# Patient Record
Sex: FEMALE | Race: WHITE | NOT HISPANIC OR LATINO | Employment: OTHER | URBAN - NONMETROPOLITAN AREA
[De-identification: names, ages, dates, MRNs, and addresses within clinical notes are randomized per-mention and may not be internally consistent; named-entity substitution may affect disease eponyms.]

---

## 2024-02-02 ENCOUNTER — OFFICE VISIT (OUTPATIENT)
Dept: URGENT CARE | Facility: CLINIC | Age: 69
End: 2024-02-02
Payer: MEDICARE

## 2024-02-02 VITALS
OXYGEN SATURATION: 96 % | DIASTOLIC BLOOD PRESSURE: 80 MMHG | HEIGHT: 63 IN | RESPIRATION RATE: 18 BRPM | SYSTOLIC BLOOD PRESSURE: 152 MMHG | HEART RATE: 94 BPM | BODY MASS INDEX: 31.01 KG/M2 | WEIGHT: 175 LBS | TEMPERATURE: 97 F

## 2024-02-02 DIAGNOSIS — R05.1 ACUTE COUGH: Primary | ICD-10-CM

## 2024-02-02 DIAGNOSIS — B34.9 VIRAL ILLNESS: ICD-10-CM

## 2024-02-02 LAB
SARS-COV-2 AG UPPER RESP QL IA: NEGATIVE
VALID CONTROL: NORMAL

## 2024-02-02 PROCEDURE — G0463 HOSPITAL OUTPT CLINIC VISIT: HCPCS

## 2024-02-02 PROCEDURE — 99203 OFFICE O/P NEW LOW 30 MIN: CPT

## 2024-02-02 PROCEDURE — 87811 SARS-COV-2 COVID19 W/OPTIC: CPT

## 2024-02-02 RX ORDER — BLOOD SUGAR DIAGNOSTIC
STRIP MISCELLANEOUS 2 TIMES DAILY
COMMUNITY
Start: 2023-12-30

## 2024-02-02 RX ORDER — PHENOL 1.4 %
600 AEROSOL, SPRAY (ML) MUCOUS MEMBRANE 2 TIMES DAILY WITH MEALS
COMMUNITY

## 2024-02-02 RX ORDER — GLIPIZIDE AND METFORMIN HCL 2.5; 5 MG/1; MG/1
TABLET, FILM COATED ORAL
COMMUNITY
Start: 2024-01-25

## 2024-02-02 RX ORDER — MELATONIN
1000 DAILY
COMMUNITY

## 2024-02-02 RX ORDER — LISINOPRIL 20 MG/1
20 TABLET ORAL DAILY
COMMUNITY

## 2024-02-02 RX ORDER — LEVOTHYROXINE SODIUM 175 UG/1
175 TABLET ORAL DAILY
COMMUNITY

## 2024-02-02 RX ORDER — SIMVASTATIN 40 MG
40 TABLET ORAL DAILY
COMMUNITY
Start: 2024-01-31

## 2024-02-02 NOTE — PATIENT INSTRUCTIONS
OTC decongestant as needed for symptoms  Plenty of fluids  Can use honey   Cool mist humidifier   Warm gargle with salt water for sore throat   Use Tylenol/ibuprofen as needed for fever or pain    Follow up with PCP in 3-5 days.  Proceed to  ER if symptoms worsen.

## 2024-02-02 NOTE — PROGRESS NOTES
[facility]        NAME: Donna Gaming is a 68 y.o. female  : 1955    MRN: [id number]  DATE: 2024  TIME: 6:34 PM    Assessment and Plan   Acute cough [R05.1]  1. Acute cough  Poct Covid 19 Rapid Antigen Test      2. Viral illness          Rapid covid: neg    Patient Instructions     OTC decongestant as needed for symptoms  Plenty of fluids  Can use honey   Cool mist humidifier   Warm gargle with salt water for sore throat   Use Tylenol/ibuprofen as needed for fever or pain    Follow up with PCP in 3-5 days.  Proceed to  ER if symptoms worsen.    Chief Complaint     Chief Complaint   Patient presents with    Cold Like Symptoms     Sinus congestion, post nasal drip, feels like she has mucus stuck in her throat x3 days  Congestion gets to the point where she feels like she can't breathe.          History of Present Illness       URI   This is a new problem. Episode onset: 3 days. There has been no fever. Associated symptoms include congestion and rhinorrhea. Pertinent negatives include no chest pain, coughing, ear pain, sore throat or wheezing. Treatments tried: Mucinex.       Review of Systems   Review of Systems   Constitutional:  Negative for chills, diaphoresis, fatigue and fever.   HENT:  Positive for congestion, postnasal drip, rhinorrhea and sinus pressure. Negative for ear pain, sore throat and trouble swallowing.    Respiratory:  Negative for cough, chest tightness, shortness of breath and wheezing.    Cardiovascular:  Negative for chest pain and palpitations.   Skin:  Negative for color change.   Neurological:  Negative for dizziness and light-headedness.   Psychiatric/Behavioral:  Negative for sleep disturbance.          Current Medications       Current Outpatient Medications:     calcium carbonate (OS-ROLO) 600 MG tablet, Take 600 mg by mouth 2 (two) times a day with meals, Disp: , Rfl:     cholecalciferol (VITAMIN D3) 1,000 units tablet, Take 1,000 Units by mouth daily, Disp:  ", Rfl:     glipiZIDE-metFORMIN (METAGLIP) 2.5-500 MG per tablet, TAKE 1 TABLET BY MOUTH TWICE DAILY BEFORE MEAL(S), Disp: , Rfl:     levothyroxine 175 mcg tablet, Take 175 mcg by mouth daily, Disp: , Rfl:     lisinopril (ZESTRIL) 20 mg tablet, Take 20 mg by mouth daily, Disp: , Rfl:     OneTouch Ultra test strip, 2 (two) times a day Test, Disp: , Rfl:     simvastatin (ZOCOR) 40 mg tablet, Take 40 mg by mouth daily, Disp: , Rfl:     Current Allergies     Allergies as of 02/02/2024 - never reviewed   Allergen Reaction Noted    Penicillins Other (See Comments) 02/02/2024            The following portions of the patient's history were reviewed and updated as appropriate: allergies, current medications, past family history, past medical history, past social history, past surgical history and problem list.     Past Medical History:   Diagnosis Date    Diabetes mellitus (HCC)     Disease of thyroid gland     Hyperlipidemia     Hypertension        Past Surgical History:   Procedure Laterality Date    CYST REMOVAL Bilateral     wrist    THYROIDECTOMY, PARTIAL Right        Family History   Problem Relation Age of Onset    Diabetes Mother     Heart disease Father          Medications have been verified.        Objective   /80   Pulse 94   Temp (!) 97 °F (36.1 °C)   Resp 18   Ht 5' 3\" (1.6 m)   Wt 79.4 kg (175 lb)   SpO2 96%   BMI 31.00 kg/m²        Physical Exam     Physical Exam  Constitutional:       General: She is not in acute distress.     Appearance: Normal appearance. She is not ill-appearing.   HENT:      Head: Normocephalic.      Right Ear: Tympanic membrane and external ear normal.      Left Ear: Tympanic membrane and external ear normal.      Nose: No congestion.      Mouth/Throat:      Mouth: Mucous membranes are moist.      Pharynx: Oropharynx is clear.   Cardiovascular:      Rate and Rhythm: Normal rate and regular rhythm.      Pulses: Normal pulses.      Heart sounds: Normal heart sounds. "   Pulmonary:      Effort: Pulmonary effort is normal. No respiratory distress.      Breath sounds: Normal breath sounds. No stridor. No wheezing, rhonchi or rales.   Lymphadenopathy:      Cervical: No cervical adenopathy.   Skin:     General: Skin is warm and dry.   Neurological:      General: No focal deficit present.      Mental Status: She is alert and oriented to person, place, and time. Mental status is at baseline.   Psychiatric:         Mood and Affect: Mood normal.         Behavior: Behavior normal.         Thought Content: Thought content normal.         Judgment: Judgment normal.

## 2025-04-24 ENCOUNTER — APPOINTMENT (OUTPATIENT)
Dept: RADIOLOGY | Facility: CLINIC | Age: 70
End: 2025-04-24
Payer: COMMERCIAL

## 2025-04-24 ENCOUNTER — OFFICE VISIT (OUTPATIENT)
Dept: URGENT CARE | Facility: CLINIC | Age: 70
End: 2025-04-24
Payer: COMMERCIAL

## 2025-04-24 VITALS
HEIGHT: 64 IN | TEMPERATURE: 96.8 F | WEIGHT: 194 LBS | DIASTOLIC BLOOD PRESSURE: 88 MMHG | RESPIRATION RATE: 20 BRPM | BODY MASS INDEX: 33.12 KG/M2 | HEART RATE: 75 BPM | OXYGEN SATURATION: 93 % | SYSTOLIC BLOOD PRESSURE: 130 MMHG

## 2025-04-24 DIAGNOSIS — M25.562 ACUTE PAIN OF LEFT KNEE: Primary | ICD-10-CM

## 2025-04-24 DIAGNOSIS — M25.562 ACUTE PAIN OF LEFT KNEE: ICD-10-CM

## 2025-04-24 PROCEDURE — 99213 OFFICE O/P EST LOW 20 MIN: CPT

## 2025-04-24 PROCEDURE — 73562 X-RAY EXAM OF KNEE 3: CPT

## 2025-04-24 PROCEDURE — S9083 URGENT CARE CENTER GLOBAL: HCPCS

## 2025-04-24 NOTE — PATIENT INSTRUCTIONS
Preliminary XR read negative, final read pending  OTC Tylenol/Ibuprofen for pain  Knee brace  Rest, Ice, Compression, and Elevation  Referral placed to PT and Orthopedic Surgery   Follow up with PCP in 3-5 days.  Proceed to  ER if symptoms worsen.    If tests have been performed at [facility], our office will contact you with results if changes need to be made to the care plan discussed with you at the visit.  You can review your full results on [facility] MyChart.

## 2025-04-24 NOTE — PROGRESS NOTES
"  [facility]        NAME: Donna Gaming is a 70 y.o. female  : 1955    MRN: [id number]  DATE: 2025  TIME: 2:21 PM    Assessment and Plan   Acute pain of left knee [M25.562]  1. Acute pain of left knee  XR knee 3 vw left non injury    Ambulatory Referral to Physical Therapy    Ambulatory Referral to Orthopedic Surgery        Preliminary XR read negative for acute osseous abnormality, final read pending. Hinged knee brace applied by Eusebia Walton. DME paperwork completed. Encouraged continued supportive measures.  RICE therapy. Referral placed to Orthopedic Surgery and PT. Follow up with PCP in 3-5 days or proceed to emergency department for worsening symptoms.  Patient verbalized understanding of instructions given.       Patient Instructions     Preliminary XR read negative, final read pending  OTC Tylenol/Ibuprofen for pain  Knee brace  Rest, Ice, Compression, and Elevation  Referral placed to PT and Orthopedic Surgery   Follow up with PCP in 3-5 days.  Proceed to  ER if symptoms worsen.    If tests have been performed at [facility], our office will contact you with results if changes need to be made to the care plan discussed with you at the visit.  [facility]    Chief Complaint     Chief Complaint   Patient presents with    Knee Pain     Pt c/o left knee pain x1 week ago. Pt had to walk with cane to get around at first and now has been using a walker. Pt knee has been giving out .          History of Present Illness       70-year-old female with a past medical history significant for type II DM and hypertension presents with complaints of left knee pain x 1 week.  Patient denies known injury or trauma.  Reports symptoms waxing and waning however with majority of pain when going from sitting to standing position and bending knee.  States it will occasionally \"give out.\"  He states some swelling as well but denies any bruising or redness.  She " has been using an Ace wrap to help with swelling and also using both a cane and walker for ambulation.  Denies numbness, tingling, or weakness.  Able to ambulate and bear weight but with some difficulty.    Knee Pain   Pertinent negatives include no numbness.       Review of Systems   Review of Systems   Constitutional:  Negative for chills and fever.   Respiratory:  Negative for cough, shortness of breath and wheezing.    Cardiovascular:  Negative for chest pain.   Gastrointestinal:  Negative for abdominal pain, diarrhea, nausea and vomiting.   Musculoskeletal:  Positive for arthralgias, gait problem and joint swelling.   Skin:  Negative for rash.   Neurological:  Negative for weakness and numbness.         Current Medications       Current Outpatient Medications:     calcium carbonate (OS-ROLO) 600 MG tablet, Take 600 mg by mouth 2 (two) times a day with meals, Disp: , Rfl:     cholecalciferol (VITAMIN D3) 1,000 units tablet, Take 1,000 Units by mouth daily, Disp: , Rfl:     glipiZIDE-metFORMIN (METAGLIP) 2.5-500 MG per tablet, TAKE 1 TABLET BY MOUTH TWICE DAILY BEFORE MEAL(S), Disp: , Rfl:     levothyroxine 175 mcg tablet, Take 175 mcg by mouth daily, Disp: , Rfl:     OneTouch Ultra test strip, 2 (two) times a day Test, Disp: , Rfl:     simvastatin (ZOCOR) 40 mg tablet, Take 40 mg by mouth daily, Disp: , Rfl:     lisinopril (ZESTRIL) 20 mg tablet, Take 20 mg by mouth daily (Patient not taking: Reported on 4/24/2025), Disp: , Rfl:     Current Allergies     Allergies as of 04/24/2025 - Reviewed 04/24/2025   Allergen Reaction Noted    Penicillins Other (See Comments) 02/02/2024    Bupropion Other (See Comments) 06/11/2020            The following portions of the patient's history were reviewed and updated as appropriate: allergies, current medications, past family history, past medical history, past social history, past surgical history and problem list.     Past Medical History:   Diagnosis Date    Diabetes mellitus  "(HCC)     Disease of thyroid gland     Hyperlipidemia     Hypertension        Past Surgical History:   Procedure Laterality Date    CYST REMOVAL Bilateral     wrist    THYROIDECTOMY, PARTIAL Right        Family History   Problem Relation Age of Onset    Diabetes Mother     Heart disease Father          Medications have been verified.        Objective   /88   Pulse 75   Temp (!) 96.8 °F (36 °C)   Resp 20   Ht 5' 4\" (1.626 m)   Wt 88 kg (194 lb)   SpO2 93%   BMI 33.30 kg/m²   No LMP recorded.       Physical Exam     Physical Exam  Vitals and nursing note reviewed.   Constitutional:       General: She is not in acute distress.     Appearance: She is not toxic-appearing.   HENT:      Head: Normocephalic.   Eyes:      Conjunctiva/sclera: Conjunctivae normal.   Pulmonary:      Effort: Pulmonary effort is normal.   Musculoskeletal:         General: Swelling and tenderness present.      Left upper leg: Normal.      Left knee: Swelling and bony tenderness present. Decreased range of motion. Tenderness present over the lateral joint line and LCL.      Left lower leg: Normal.      Comments: TTP diffusely over left anterior knee with majority of tenderness to lateral aspect. Mild swelling without erythema or ecchymosis. Mildly reduced ROM, pain with flexion. Instability testing negative.    Skin:     General: Skin is warm and dry.   Neurological:      Mental Status: She is alert and oriented to person, place, and time.      Sensory: Sensation is intact.      Motor: Motor function is intact.      Gait: Gait is intact.   Psychiatric:         Mood and Affect: Mood normal.         Behavior: Behavior normal.                   "

## 2025-04-25 ENCOUNTER — RESULTS FOLLOW-UP (OUTPATIENT)
Dept: URGENT CARE | Facility: CLINIC | Age: 70
End: 2025-04-25

## 2025-05-19 RX ORDER — ALBUTEROL SULFATE 90 UG/1
2 INHALANT RESPIRATORY (INHALATION) EVERY 6 HOURS PRN
COMMUNITY

## 2025-05-20 ENCOUNTER — OFFICE VISIT (OUTPATIENT)
Dept: OBGYN CLINIC | Facility: CLINIC | Age: 70
End: 2025-05-20
Payer: COMMERCIAL

## 2025-05-20 VITALS — BODY MASS INDEX: 33.3 KG/M2 | HEIGHT: 64 IN

## 2025-05-20 DIAGNOSIS — M17.12 PRIMARY OSTEOARTHRITIS OF LEFT KNEE: Primary | ICD-10-CM

## 2025-05-20 PROCEDURE — 20610 DRAIN/INJ JOINT/BURSA W/O US: CPT | Performed by: STUDENT IN AN ORGANIZED HEALTH CARE EDUCATION/TRAINING PROGRAM

## 2025-05-20 PROCEDURE — 99204 OFFICE O/P NEW MOD 45 MIN: CPT | Performed by: STUDENT IN AN ORGANIZED HEALTH CARE EDUCATION/TRAINING PROGRAM

## 2025-05-20 RX ORDER — LEVOTHYROXINE SODIUM 150 UG/1
1 TABLET ORAL DAILY
COMMUNITY
Start: 2025-05-07

## 2025-05-20 RX ORDER — TRIAMCINOLONE ACETONIDE 40 MG/ML
40 INJECTION, SUSPENSION INTRA-ARTICULAR; INTRAMUSCULAR
Status: COMPLETED | OUTPATIENT
Start: 2025-05-20 | End: 2025-05-20

## 2025-05-20 RX ORDER — BUPIVACAINE HYDROCHLORIDE 5 MG/ML
3 INJECTION, SOLUTION PERINEURAL
Status: COMPLETED | OUTPATIENT
Start: 2025-05-20 | End: 2025-05-20

## 2025-05-20 RX ADMIN — TRIAMCINOLONE ACETONIDE 40 MG: 40 INJECTION, SUSPENSION INTRA-ARTICULAR; INTRAMUSCULAR at 09:00

## 2025-05-20 RX ADMIN — BUPIVACAINE HYDROCHLORIDE 3 ML: 5 INJECTION, SOLUTION PERINEURAL at 09:00

## 2025-05-20 NOTE — PROGRESS NOTES
Assessment & Plan  Primary osteoarthritis of left knee  Donna Gaming is a 70 y.o. year old female with chronic left knee pain due to osteoarthritis.      We had a shared medical decision making discussion with the patient regarding their diagnosis of knee arthritis  We reviewed the imaging and physical exam findings together that confirm the diagnosis of  Left knee arthritis.   We discussed the following treatments, and included the risks and benefits of each.   Lifestyle modifications, which include diet, exercise, and health changes, as well as changes in activities.  The benefits are clear of overall health improvements and there is minimal risk.   Physical therapy, which has been shown in several studies to be beneficial in reducing pain and improving function, but does not repair the cartilage.    Use of over-the-counter, non-narcotic pain relievers and anti-inflammatory medications are usually the first choice of therapy for arthritis of the knee. I think the most effective medications are nonsteroidal anti-inflammatory drugs, or NSAIDs. NSAIDs, such as ibuprofen, celebrex, and naproxen, are available both over-the-counter and by prescription and help by decreasing inflammation.  Injections such as steroid and viscosupplementation--both of which can help in patients with OA, but have the limitation of an overall small effect size.  We also discussed 'biologics' including PRP and stem cell injections. There is limited data right now to support the use of these treatment strategies, but some evidence that PRP can improve pain and function better than steroid injections.   Finally, we discussed surgical options, including arthroscopy, debridement, and osteotomies, Donna Gaming is not a candidate for these given the extent of their disease. We discussed the role of meeting with a total knee arthroplasty surgeon if the patient fails to improve with non-operative treatment.    At this point, the patient wishes to  proceed with CSI. Risks and benefits discussed with patient including increase in blood sugar and infection.   Orders:    Large joint arthrocentesis: L knee        General  Chief Complaint   Patient presents with    Left Knee - Pain, New Patient Visit        Subjective    Donna Gaming is a 70 y.o. female who presents with LEFT knee pain:    Chief Complaint   Patient presents with    Left Knee - Pain, New Patient Visit          History of Present Illness   The patient complains of left knee pain. The pain started several  years ago without injury. At that time the patient describes gradual increase her her pain.    The pain is 7/10. The pain is located Anterior. The pain is described as aching. They have tried NSAIDS and biofreeze for their problem. She will use an ace wrap for comfort. Pain improves with rest. Pain worsens with weightbearing activities like walking. She uses a Rolator walker to ambulate.     The patient did not hear a pop. The patient does have swelling.  The patient does not feel unstable.    Ortho Sports Medicine Patient Answers  Failed to redirect to the Timeline version of the [employer].  Allergies[1]  Encounter Medications[2]  Past Medical History:   Diagnosis Date    Diabetes mellitus (HCC)     Disease of thyroid gland     Hyperlipidemia     Hypertension      Past Surgical History:   Procedure Laterality Date    CYST REMOVAL Bilateral     wrist    THYROIDECTOMY, PARTIAL Right      Family History   Problem Relation Age of Onset    Diabetes Mother     Heart disease Father      Social History[3]        Objective    There were no vitals filed for this visit.  Body mass index is 33.3 kg/m².  Physical Exam  Vitals and nursing note reviewed.   Constitutional:       General: She is not in acute distress.     Appearance: She is well-developed.   HENT:      Head: Normocephalic and atraumatic.     Eyes:      Conjunctiva/sclera: Conjunctivae normal.       Cardiovascular:      Rate and Rhythm: Normal  rate and regular rhythm.      Heart sounds: No murmur heard.  Pulmonary:      Effort: Pulmonary effort is normal. No respiratory distress.      Breath sounds: Normal breath sounds.   Abdominal:      Palpations: Abdomen is soft.      Tenderness: There is no abdominal tenderness.     Musculoskeletal:         General: No swelling.      Cervical back: Neck supple.     Skin:     General: Skin is warm and dry.      Capillary Refill: Capillary refill takes less than 2 seconds.     Neurological:      Mental Status: She is alert.     Psychiatric:         Mood and Affect: Mood normal.       Knee Exam     left   Inspection: Without ecchymosis, wounds or prior incisions   Gait: Antalgic   Quadriceps atrophy: Mild   Tenderness: Medial Joint Line, Lateral Joint Line, Medial Patellar Facet, and Lateral Patellar Facet   ROM: 5-110   Effusion: Trace   Meniscus Exam   left   Medial Meniscus: Joint Line Tenderness   Lateral Meniscus: Joint Line Tenderness   Ligament Exam   Right   ACL Lachman: negative    Anterior Drawer:  negative   PCL Posterior Drawer:negative       MCL Stable at 0 and 30 degrees of flexion   LCL Stable at 0 and 30 degrees of flexion   PLC Deferred     Patellofemoral exam   left   Patella grind test negative   Patella instability: negative  1 Quadrants of translation   Patellar Translation Apprehension negative     Distally the patient's neurovascular status is normal.    Review of Prior Testing  I independently interpreted the following test: X-rays of the left knee taken 4/24/2025, including weight bearing and merchant views.  Multiple views of the knee show degenerative changes consistent with mild osteoarthritis.         Procedure:  Large joint arthrocentesis: L knee    Performed by: Madhu Crowder MD  Authorized by: Madhu Crowder MD    Universal Protocol:  procedure performed by consultantConsent: Verbal consent obtained  Risks and benefits: risks, benefits and alternatives were  discussed  Consent given by: patient  Timeout called at: 5/20/2025 9:03 AM.  Patient understanding: patient states understanding of the procedure being performed  Site marked: the operative site was marked  Radiology Images displayed and confirmed. If images not available, report reviewed: imaging studies available  Patient identity confirmed: verbally with patient  Supporting Documentation  Indications: pain and joint swelling     Is this a Visco injection? NoProcedure Details  Location: knee - L knee  Needle size: 22 G  Ultrasound guidance: no  Approach: anterolateral  Medications administered: 3 mL bupivacaine 0.5 %; 40 mg triamcinolone acetonide 40 mg/mL    Patient tolerance: patient tolerated the procedure well with no immediate complications  Dressing:  Sterile dressing applied              Follow Up: Return if symptoms worsen or fail to improve.    All questions answered and patient agrees with plan.       Scribe Attestation      I,:  Antonia Bar am acting as a scribe while in the presence of the attending physician.:       I,:  Madhu Crowder MD personally performed the services described in this documentation    as scribed in my presence.:                  [1]   Allergies  Allergen Reactions    Penicillins Other (See Comments)     Unknown what reaction is, happened when she was a young child.     Bupropion Other (See Comments)     Worsened depression, constant crying.   [2]   Outpatient Encounter Medications as of 5/20/2025   Medication Sig Dispense Refill    albuterol (PROVENTIL HFA,VENTOLIN HFA) 90 mcg/act inhaler Inhale 2 puffs every 6 (six) hours as needed      calcium carbonate (OS-ROLO) 600 MG tablet Take 600 mg by mouth in the morning and 600 mg in the evening. Take with meals.      cholecalciferol (VITAMIN D3) 1,000 units tablet Take 1,000 Units by mouth in the morning.      glipiZIDE-metFORMIN (METAGLIP) 2.5-500 MG per tablet       levothyroxine 150 mcg tablet Take 1 tablet by mouth in  the morning      OneTouch Ultra test strip in the morning and in the evening. Test.      simvastatin (ZOCOR) 40 mg tablet Take 40 mg by mouth in the morning.      [DISCONTINUED] levothyroxine 175 mcg tablet Take 175 mcg by mouth in the morning.      lisinopril (ZESTRIL) 20 mg tablet Take 20 mg by mouth daily (Patient not taking: Reported on 5/20/2025)       No facility-administered encounter medications on file as of 5/20/2025.   [3]   Social History  Tobacco Use    Smoking status: Every Day     Current packs/day: 0.50     Types: Cigarettes    Smokeless tobacco: Never   Vaping Use    Vaping status: Never Used   Substance Use Topics    Alcohol use: Not Currently    Drug use: Never